# Patient Record
Sex: MALE | Race: WHITE | Employment: FULL TIME | URBAN - METROPOLITAN AREA
[De-identification: names, ages, dates, MRNs, and addresses within clinical notes are randomized per-mention and may not be internally consistent; named-entity substitution may affect disease eponyms.]

---

## 2017-03-06 ENCOUNTER — TELEPHONE (OUTPATIENT)
Dept: FAMILY MEDICINE CLINIC | Facility: CLINIC | Age: 32
End: 2017-03-06

## 2017-04-04 ENCOUNTER — OFFICE VISIT (OUTPATIENT)
Dept: FAMILY MEDICINE CLINIC | Facility: CLINIC | Age: 32
End: 2017-04-04

## 2017-04-04 VITALS
BODY MASS INDEX: 37.91 KG/M2 | RESPIRATION RATE: 16 BRPM | TEMPERATURE: 98 F | HEIGHT: 72.75 IN | SYSTOLIC BLOOD PRESSURE: 116 MMHG | DIASTOLIC BLOOD PRESSURE: 84 MMHG | WEIGHT: 286 LBS | HEART RATE: 48 BPM

## 2017-04-04 DIAGNOSIS — N62 GYNECOMASTIA, MALE: ICD-10-CM

## 2017-04-04 DIAGNOSIS — I82.4Y2 ACUTE DEEP VEIN THROMBOSIS (DVT) OF PROXIMAL VEIN OF LEFT LOWER EXTREMITY (HCC): Primary | ICD-10-CM

## 2017-04-04 PROCEDURE — 99214 OFFICE O/P EST MOD 30 MIN: CPT | Performed by: FAMILY MEDICINE

## 2017-04-04 NOTE — PROGRESS NOTES
Chief Complaint:  Patient presents with:  Leg Pain: Went to THE [facility] ED for pain in the L calf. Dx with DVT 12/29/16. Still has some swelling but no pain. Taking Eliquis. Has 3 tablets left  Breast Problem: Would like to discuss gynecomastia.  Experiencing pa GENERAL: vitals reviewed and listed above, alert, oriented, appears well hydrated and in no acute distress  HEENT: atraumatic, conjunctiva clear, no obvious abnormalities on inspection   LUNGS: clear to auscultation bilaterally, no wheezes, rales or rh

## 2017-04-05 ENCOUNTER — APPOINTMENT (OUTPATIENT)
Dept: LAB | Age: 32
End: 2017-04-05
Attending: FAMILY MEDICINE
Payer: COMMERCIAL

## 2017-04-05 DIAGNOSIS — N62 GYNECOMASTIA, MALE: ICD-10-CM

## 2017-04-05 PROCEDURE — 84702 CHORIONIC GONADOTROPIN TEST: CPT | Performed by: FAMILY MEDICINE

## 2017-04-05 PROCEDURE — 84403 ASSAY OF TOTAL TESTOSTERONE: CPT | Performed by: FAMILY MEDICINE

## 2017-04-05 PROCEDURE — 36415 COLL VENOUS BLD VENIPUNCTURE: CPT | Performed by: FAMILY MEDICINE

## 2017-04-05 PROCEDURE — 83002 ASSAY OF GONADOTROPIN (LH): CPT | Performed by: FAMILY MEDICINE

## 2017-04-05 PROCEDURE — 82670 ASSAY OF TOTAL ESTRADIOL: CPT | Performed by: FAMILY MEDICINE

## 2017-09-11 ENCOUNTER — TELEPHONE (OUTPATIENT)
Dept: FAMILY MEDICINE CLINIC | Facility: CLINIC | Age: 32
End: 2017-09-11

## 2017-09-11 DIAGNOSIS — N62 GYNECOMASTIA, MALE: Primary | ICD-10-CM

## 2017-09-13 PROBLEM — N62 GYNECOMASTIA, MALE: Status: ACTIVE | Noted: 2017-09-13

## 2017-09-13 PROBLEM — N62 GYNECOMASTIA, MALE: Status: ACTIVE | Noted: 2017-04-04

## 2017-09-14 NOTE — TELEPHONE ENCOUNTER
Randy Hercules DO at 4/4/2017  8:52 AM     Status: Signed : Randy Hercules DO (Physician)    Would like to discuss gynecomastia.   Has had this for some time but since he has lost some weight it is no more pronounced  Also started working as a firef

## 2017-11-06 ENCOUNTER — OFFICE VISIT (OUTPATIENT)
Dept: SURGERY | Facility: CLINIC | Age: 32
End: 2017-11-06

## 2017-11-06 VITALS
HEART RATE: 73 BPM | SYSTOLIC BLOOD PRESSURE: 135 MMHG | HEIGHT: 73 IN | DIASTOLIC BLOOD PRESSURE: 84 MMHG | TEMPERATURE: 98 F | WEIGHT: 303 LBS | BODY MASS INDEX: 40.16 KG/M2

## 2017-11-06 DIAGNOSIS — N62 GYNECOMASTIA, MALE: Primary | ICD-10-CM

## 2017-11-06 PROCEDURE — 99243 OFF/OP CNSLTJ NEW/EST LOW 30: CPT | Performed by: SURGERY

## 2017-11-06 NOTE — CONSULTS
New Patient Consultation    This is the first visit for this 29year old male with gynecomastia    History of Present Illness: The patient is a 29year old male referred by Dr. Barbara Gallagher for evaluation of bilateral breast enlargement.   The patient relates t anaphylaxis.     HEENT:    The patient denies ear pain, ear drainage, hearing loss, change in vision, double vision, cataracts, glaucoma, nasal congestion, nosebleed, hoarseness, sore throat, or swollen glands    Respiratory:   The patient denies shortness Left arm, Patient Position: Sitting, Cuff Size: large)   Pulse 73   Temp 98.1 °F (36.7 °C) (Tympanic)   Ht 1.854 m (6' 1\")   Wt (!) 137.4 kg (303 lb)   BMI 39.98 kg/m²     The patient is awake, alert, and oriented.   He is a well-nourished, well-developed amenable to this approach. He will follow-up in 3-6 months for reassessment. The plan was reviewed with the patient and questions were answered. Carlos Recio

## 2017-11-30 ENCOUNTER — TELEPHONE (OUTPATIENT)
Dept: FAMILY MEDICINE CLINIC | Facility: CLINIC | Age: 32
End: 2017-11-30

## 2017-11-30 NOTE — TELEPHONE ENCOUNTER
Received form patient needs filled out for [employer], stamped envelope enclosed for return.  Form in triage

## (undated) NOTE — MR AVS SNAPSHOT
[facility] [address], [address]  [address] [place] [address] [phone]               Thank you for choosing us for your health care visit with Osvaldo Mcclendon DO.   We are glad to serve you and happy to provide you with this sum Commonly known as:  ELIQUIS                   MyChart     Sign up for [facility], your secure online medical record. [employer] will allow you to access patient instructions from your recent visit,  view other health information, and more.  To sign up or find mo